# Patient Record
Sex: MALE | Race: WHITE | URBAN - METROPOLITAN AREA
[De-identification: names, ages, dates, MRNs, and addresses within clinical notes are randomized per-mention and may not be internally consistent; named-entity substitution may affect disease eponyms.]

---

## 2021-09-01 ENCOUNTER — HOSPITAL ENCOUNTER (OUTPATIENT)
Dept: RESEARCH | Facility: CLINIC | Age: 21
End: 2021-09-01
Attending: INTERNAL MEDICINE
Payer: COMMERCIAL

## 2021-09-01 PROCEDURE — 510N000017 HC CRU PATIENT CARE, PER 15 MIN

## 2021-09-01 PROCEDURE — 510N000009 HC RESEARCH FACILITY, PER 15 MIN

## 2021-09-01 PROCEDURE — 300N000003 HC RESEARCH SPECIMEN PROCESSING, SIMPLE

## 2021-09-02 NOTE — ADDENDUM NOTE
Encounter addended by: Johnny Hagan on: 9/2/2021 6:56 AM   Actions taken: Charge Capture section accepted